# Patient Record
Sex: FEMALE | Race: WHITE | NOT HISPANIC OR LATINO | URBAN - METROPOLITAN AREA
[De-identification: names, ages, dates, MRNs, and addresses within clinical notes are randomized per-mention and may not be internally consistent; named-entity substitution may affect disease eponyms.]

---

## 2025-03-26 ENCOUNTER — OFFICE VISIT (OUTPATIENT)
Dept: URGENT CARE | Age: 40
End: 2025-03-26
Payer: COMMERCIAL

## 2025-03-26 VITALS
HEIGHT: 60 IN | DIASTOLIC BLOOD PRESSURE: 81 MMHG | BODY MASS INDEX: 35.34 KG/M2 | WEIGHT: 180 LBS | OXYGEN SATURATION: 98 % | RESPIRATION RATE: 18 BRPM | TEMPERATURE: 98.9 F | HEART RATE: 91 BPM | SYSTOLIC BLOOD PRESSURE: 143 MMHG

## 2025-03-26 DIAGNOSIS — I10 HYPERTENSION, UNSPECIFIED TYPE: Primary | ICD-10-CM

## 2025-03-26 PROCEDURE — 3008F BODY MASS INDEX DOCD: CPT | Performed by: PHYSICIAN ASSISTANT

## 2025-03-26 PROCEDURE — 99204 OFFICE O/P NEW MOD 45 MIN: CPT | Performed by: PHYSICIAN ASSISTANT

## 2025-03-26 PROCEDURE — 3077F SYST BP >= 140 MM HG: CPT | Performed by: PHYSICIAN ASSISTANT

## 2025-03-26 PROCEDURE — 1036F TOBACCO NON-USER: CPT | Performed by: PHYSICIAN ASSISTANT

## 2025-03-26 PROCEDURE — 93000 ELECTROCARDIOGRAM COMPLETE: CPT | Performed by: PHYSICIAN ASSISTANT

## 2025-03-26 PROCEDURE — 3079F DIAST BP 80-89 MM HG: CPT | Performed by: PHYSICIAN ASSISTANT

## 2025-03-26 RX ORDER — BUPROPION HYDROCHLORIDE 300 MG/1
TABLET ORAL
COMMUNITY

## 2025-03-26 RX ORDER — VENLAFAXINE HYDROCHLORIDE 150 MG/1
CAPSULE, EXTENDED RELEASE ORAL
COMMUNITY

## 2025-03-26 RX ORDER — LOSARTAN POTASSIUM 25 MG/1
TABLET ORAL
COMMUNITY
Start: 2024-09-19

## 2025-03-26 ASSESSMENT — PATIENT HEALTH QUESTIONNAIRE - PHQ9
SUM OF ALL RESPONSES TO PHQ9 QUESTIONS 1 AND 2: 0
1. LITTLE INTEREST OR PLEASURE IN DOING THINGS: NOT AT ALL
2. FEELING DOWN, DEPRESSED OR HOPELESS: NOT AT ALL

## 2025-03-26 NOTE — PROGRESS NOTES
Subjective   Patient ID: Tyesha Fritz is a 40 y.o. female. They present today with a chief complaint of BP issue (Bp has been high lately).    History of Present Illness  HPI    40-year-old patient presents to clinic with complaints of 1 episode of elevated blood pressure while at work today at 198/90  Which was preceded by headaches for the past 4 days bitemporal and achy, fatigue, 1 episode of anterior sharp chest pain about a week ago, and intermittent episodes of numbness at the palms of bilateral hands while playing on patient's phone.  Reports no history of cardiac problems.  Reports no family history of sudden cardiac death. Reports has follow up with pcp tomorrow.  Denies illegal drug use, palpitations, current chest pain, dizziness, presyncope, syncope, nausea, vomiting, excessive caffeine, vision changes, unilateral weakness.  Past Medical History  Allergies as of 03/26/2025    (No Known Allergies)       (Not in a hospital admission)       No past medical history on file.    No past surgical history on file.     reports that she has never smoked. She has never used smokeless tobacco.    Review of Systems  Review of Systems     ROS negative with the exception as noted on HPI                           Objective    Vitals:    03/26/25 1641   BP: 143/81   BP Location: Left arm   Pulse: 91   Resp: 18   Temp: 37.2 °C (98.9 °F)   SpO2: 98%   Weight: 81.6 kg (180 lb)   Height: 1.524 m (5')     No LMP recorded.    Physical Exam  Constitutional:       Appearance: Normal appearance.   HENT:      Head: Normocephalic and atraumatic.   Neck:      Vascular: No carotid bruit.   Cardiovascular:      Rate and Rhythm: Normal rate and regular rhythm.      Pulses:           Carotid pulses are 2+ on the right side and 2+ on the left side.       Radial pulses are 2+ on the right side and 2+ on the left side.        Dorsalis pedis pulses are 2+ on the right side and 2+ on the left side.      Heart sounds: Normal heart sounds. No  murmur heard.  Pulmonary:      Effort: Pulmonary effort is normal.      Breath sounds: Normal breath sounds and air entry. No stridor, decreased air movement or transmitted upper airway sounds. No decreased breath sounds, wheezing, rhonchi or rales.   Musculoskeletal:      Right lower leg: No edema.      Left lower leg: No edema.   Skin:     General: Skin is warm and dry.   Neurological:      Mental Status: She is alert and oriented to person, place, and time.      GCS: GCS eye subscore is 4. GCS verbal subscore is 5. GCS motor subscore is 6.      Cranial Nerves: No cranial nerve deficit.      Sensory: No sensory deficit.      Gait: Gait normal.      Deep Tendon Reflexes: Reflexes normal.         Procedures    Point of Care Test & Imaging Results from this visit  No results found for this visit on 03/26/25.   Imaging  No results found.    Cardiology, Vascular, and Other Imaging  No other imaging results found for the past 2 days      Diagnostic study results (if any) were reviewed by Bettie Collado PA-C.    Assessment/Plan   Allergies, medications, history, and pertinent labs/EKGs/Imaging reviewed by Bettie Collado PA-C.    1 episode of elevated blood pressure while at work today at 198/90  Which was preceded by headaches for the past 4 days bitemporal and achy, fatigue, 1 episode of anterior sharp chest pain about a week ago, and intermittent episodes of numbness at the palms of bilateral hands while playing on patient's phone.   ECG in clinic: NSR at a rate of 83.  No axis deviation.  No ST elevations or depressions.  No P wave abnormalities.  Neuro and cardiac exam is intact.  Patient was unable to provide urine for UA.  Patient is advised to follow-up with PCP as scheduled tomorrow.  Discussed with pt. Monitor BP at home; take bp in the morning at night and in the morning at rest with arm at heart level and without crossed legs. Keep a log for 1-2 weeks. Should pt. Experience headaches, dizziness,  vision changes, chest pain, palpitation, pre-syncope, syncope, pedal/leg edema with elevated Bps pt. Should proceed to the ED. Discussed disease/illness presentation, treatment options, progression, complications, and outcomes with patient. Pt. Has expressed understanding and is an agreement of plan of care.    Medical Decision Making      Orders and Diagnoses  Diagnoses and all orders for this visit:  Hypertension, unspecified type  -     ECG 12 lead (Clinic Performed)      Medical Admin Record      Patient disposition: Home    Electronically signed by Bettie Collado PA-C  7:21 PM

## 2025-03-26 NOTE — PATIENT INSTRUCTIONS
Monitor your blood pressure and keep a log. Take log to your follow up with your pcp.     If you experience any of the following call 911 or proceed to the ED immediately  -Pain, pressure, or discomfort in the center of the chest  -Pain or discomfort in other parts of the upper body, including the shoulders, arms, back, neck, jaw, or stomach  -Shortness of breath  -Nausea, vomiting, burping, or heartburn  -Sweating or having cold, clammy skin  -Racing or uneven heart rate  -Feeling dizzy or lightheaded, or even fainting   -severe headaches   -vision changes